# Patient Record
Sex: MALE | URBAN - METROPOLITAN AREA
[De-identification: names, ages, dates, MRNs, and addresses within clinical notes are randomized per-mention and may not be internally consistent; named-entity substitution may affect disease eponyms.]

---

## 2021-03-11 ENCOUNTER — OFFICE VISIT (OUTPATIENT)
Dept: URBAN - METROPOLITAN AREA CLINIC 91 | Facility: CLINIC | Age: 46
End: 2021-03-11
Payer: COMMERCIAL

## 2021-03-11 DIAGNOSIS — H10.89 OTHER CONJUNCTIVITIS: Primary | ICD-10-CM

## 2021-03-11 PROCEDURE — 99203 OFFICE O/P NEW LOW 30 MIN: CPT | Performed by: SPECIALIST

## 2021-03-11 RX ORDER — NEOMYCIN SULFATE, POLYMYXIN B SULFATE AND DEXAMETHASONE 3.5; 10000; 1 MG/ML; [USP'U]/ML; MG/ML
SUSPENSION OPHTHALMIC
Qty: 5 | Refills: 1 | Status: ACTIVE
Start: 2021-03-11

## 2021-03-11 NOTE — IMPRESSION/PLAN
Impression: Other conjunctivitis: H10.89. Plan: For conjunctivitis, I have stressed the contagious nature of the infection, and patient should wash their hands frequently in order to avoid spreading to others. I have also given patient Rx for topical antibiotic to use as directed. Start Maxitrol gtts TID OD x1 week then D/C.